# Patient Record
Sex: FEMALE | Employment: OTHER | URBAN - METROPOLITAN AREA
[De-identification: names, ages, dates, MRNs, and addresses within clinical notes are randomized per-mention and may not be internally consistent; named-entity substitution may affect disease eponyms.]

---

## 2019-04-12 RX ORDER — LORAZEPAM 0.5 MG/1
0.5 TABLET ORAL 2 TIMES DAILY
COMMUNITY

## 2019-04-13 ENCOUNTER — ANESTHESIA EVENT (OUTPATIENT)
Dept: OPERATING ROOM | Age: 74
End: 2019-04-13
Payer: MEDICARE

## 2019-04-13 NOTE — H&P
History and Physical    Patient's Name/Date of Birth: Lucille Lyman / 1945 (1 y.o.)    Date: April 13, 2019     Chief Complaint: Decreased vision of the right eye    HPI: Mature right cataract with decreased vision. Risks and complications as well as options and benefits were discussed with the patient and she has elected to proceed with right cataract extraction with intra ocular lens implant. Past Medical History:   Diagnosis Date    Hyperlipidemia     Hypertension        Past Surgical History:   Procedure Laterality Date    ABDOMINAL ADHESION SURGERY      APPENDECTOMY      BACK SURGERY      lumbar laminectomy    CATARACT REMOVAL WITH IMPLANT Left 6 2 15    and removal cystleft upper eyelid    DILATION AND CURETTAGE OF UTERUS      OTHER SURGICAL HISTORY      lymph nodes removed left axilla  d/t 2 bouts of cat scratch fever       Prior to Admission medications    Medication Sig Start Date End Date Taking? Authorizing Provider   LORazepam (ATIVAN) 0.5 MG tablet Take 0.5 mg by mouth 2 times daily. Yes Historical Provider, MD   amLODIPine (NORVASC) 5 MG tablet Take 5 mg by mouth daily    Historical Provider, MD       Iodine and Shellfish-derived products    History reviewed. No pertinent family history.     Social History     Socioeconomic History    Marital status:      Spouse name: Not on file    Number of children: Not on file    Years of education: Not on file    Highest education level: Not on file   Occupational History    Not on file   Social Needs    Financial resource strain: Not on file    Food insecurity:     Worry: Not on file     Inability: Not on file    Transportation needs:     Medical: Not on file     Non-medical: Not on file   Tobacco Use    Smoking status: Current Every Day Smoker     Packs/day: 1.00     Years: 55.00     Pack years: 55.00     Types: Cigarettes    Smokeless tobacco: Never Used   Substance and Sexual Activity    Alcohol use: Yes     Comment: 1-2 beers daily    Drug use: Not on file    Sexual activity: Not on file   Lifestyle    Physical activity:     Days per week: Not on file     Minutes per session: Not on file    Stress: Not on file   Relationships    Social connections:     Talks on phone: Not on file     Gets together: Not on file     Attends Yarsani service: Not on file     Active member of club or organization: Not on file     Attends meetings of clubs or organizations: Not on file     Relationship status: Not on file    Intimate partner violence:     Fear of current or ex partner: Not on file     Emotionally abused: Not on file     Physically abused: Not on file     Forced sexual activity: Not on file   Other Topics Concern    Not on file   Social History Narrative    Not on file       Review of Systems:   CONSTITUTIONAL:  Oriented to person, place and time   EYES:  Mature right cataract with decreased vision effecting reading driving and all routine home activities. Visual acuity is 20/60  HEENT:  negative  RESPIRATORY:  negative  CARDIOVASCULAR:  negative  GASTROINTESTINAL:  negative  GENITOURINARY:  negative  INTEGUMENT/BREAST:  negative  HEMATOLOGIC/LYMPHATIC:  negative  ALLERGIC/IMMUNOLOGIC:  negative  ENDOCRINE:  negative  MUSCULOSKELETAL:  negative  NEUROLOGICAL:  negative  BEHAVIOR/PSYCH:  negative    Physical Exam:  Vitals:    04/12/19 1112   Weight: 120 lb (54.4 kg)   Height: 5' 3\" (1.6 m)       CONSTITUTIONAL:  awake, alert, cooperative, no apparent distress, and appears stated age  EYES:  Lids and lashes normal, pupils equal, round and reactive to light, extra ocular muscles intact, sclera clear, conjunctiva normal  ENT:  Normocephalic, without obvious abnormality, atraumatic, sinuses nontender on palpation, external ears without lesions, oral pharynx with moist mucus membranes, tonsils without erythema or exudates, gums normal and good dentition.   NECK:  Supple, symmetrical, trachea midline, no adenopathy, thyroid symmetric, not enlarged and no tenderness, skin normal  HEMATOLOGIC/LYMPHATICS:  no cervical lymphadenopathy and no supraclavicular lymphadenopathy  BACK:  Symmetric, no curvature, spinous processes are non-tender on palpation, paraspinous muscles are non-tender on palpation, no costal vertebral tenderness  LUNGS:  No increased work of breathing, good air exchange, clear to auscultation bilaterally, no crackles or wheezing  CARDIOVASCULAR:  Normal apical impulse, regular rate and rhythm, normal S1 and S2, no S3 or S4, and no murmur noted  ABDOMEN:  No scars, normal bowel sounds, soft, non-distended, non-tender, no masses palpated, no hepatosplenomegally  CHEST/BREASTS:  Breasts symmetrical, skin without lesion(s), no nipple retraction or dimpling, no nipple discharge, no masses palpated, no axillary or supraclavicular adenopathy  GENITAL/URINARY: Deferred  MUSCULOSKELETAL:  There is no redness, warmth, or swelling of the joints. Full range of motion noted. Motor strength is 5 out of 5 all extremities bilaterally. Tone is normal.  NEUROLOGIC:  Awake, alert, oriented to name, place and time. Cranial nerves II-XII are grossly intact. Motor is 5 out of 5 bilaterally. Cerebellar finger to nose, heel to shin intact. Sensory is intact. Babinski down going, Romberg negative, and gait is normal.  SKIN:  no bruising or bleeding, normal skin color, texture, turgor, no redness, warmth, or swelling and no rashes    Assessment:  Active Problems:    * No active hospital problems. *  Resolved Problems:    * No resolved hospital problems. *      Plan:  1.  Right cataract extraction with intra ocular lens implant    Electronically signed by Abiel Adams MD on 4/13/19 at 7:59 PM

## 2019-04-15 ASSESSMENT — LIFESTYLE VARIABLES: SMOKING_STATUS: 1

## 2019-04-15 NOTE — ANESTHESIA PRE PROCEDURE
Department of Anesthesiology  Preprocedure Note       Name:  Juvenal Martino   Age:  74 y.o.  :  1945                                          MRN:  [id number]         Date:  2019      Surgeon: Andrae Hartley):  Magda Rojas MD    Procedure: RIGHT EYE CATARACT EMULSIFICATION IOL IMPLANT (Right )    Medications prior to admission:   Prior to Admission medications    Medication Sig Start Date End Date Taking? Authorizing Provider   LORazepam (ATIVAN) 0.5 MG tablet Take 0.5 mg by mouth 2 times daily. Yes Historical Provider, MD   amLODIPine (NORVASC) 5 MG tablet Take 5 mg by mouth daily   Yes Historical Provider, MD       Current medications:    Current Facility-Administered Medications   Medication Dose Route Frequency Provider Last Rate Last Dose    lactated ringers infusion   Intravenous Continuous Edmund Palomares MD        flurbiprofen (OCUFEN) 0.03 % ophthalmic solution 1 drop  1 drop Right Eye Q5 Min Lucy BARCENAS MD        phenylephrine (MYDFRIN) 2.5 % ophthalmic solution 1 drop  1 drop Right Eye Q5 Min Lucy BARCENAS MD        tetracaine (TETRAVISC) 0.5 % ophthalmic solution 1 drop  1 drop Right Eye Once Magda Rojas MD        cyclopentolate (CYCLOGYL) 1 % ophthalmic solution 1 drop  1 drop Right Eye Q5 Min Magda Rojas MD        phenylephrine (JULIANNE-SYNEPHRINE) 10 % ophthalmic solution 1 drop  1 drop Right Eye PRN Magda Rojas MD           Allergies:     Allergies   Allergen Reactions    Iodine Anaphylaxis and Hives     IVP and topical    Shellfish-Derived Products Anaphylaxis       Problem List:    Patient Active Problem List   Diagnosis Code    Left cataract H26.9    Inclusion cyst L72.0       Past Medical History:        Diagnosis Date    Hyperlipidemia     Hypertension        Past Surgical History:        Procedure Laterality Date    ABDOMINAL ADHESION SURGERY      APPENDECTOMY      BACK SURGERY      lumbar laminectomy    CATARACT REMOVAL WITH IMPLANT Left 6 2 15    and removal cystleft upper eyelid    DILATION AND CURETTAGE OF UTERUS      OTHER SURGICAL HISTORY      lymph nodes removed left axilla  d/t 2 bouts of cat scratch fever       Social History:    Social History     Tobacco Use    Smoking status: Current Every Day Smoker     Packs/day: 1.00     Years: 55.00     Pack years: 55.00     Types: Cigarettes    Smokeless tobacco: Never Used   Substance Use Topics    Alcohol use: Yes     Comment: 1-2 beers daily                                Ready to quit: Not Answered  Counseling given: Not Answered      Vital Signs (Current):   Vitals:    04/12/19 1112 04/16/19 0742   BP:  124/69   Pulse:  59   Resp:  16   Temp:  98 °F (36.7 °C)   SpO2:  97%   Weight: 120 lb (54.4 kg) 124 lb (56.2 kg)   Height: 5' 3\" (1.6 m) 5' 3\" (1.6 m)                                              BP Readings from Last 3 Encounters:   04/16/19 124/69   06/02/15 131/69       NPO Status: Time of last liquid consumption: 2000                        Time of last solid consumption: 2000                        Date of last liquid consumption: 04/15/19                        Date of last solid food consumption: 04/15/19    BMI:   Wt Readings from Last 3 Encounters:   04/16/19 124 lb (56.2 kg)   06/02/15 136 lb (61.7 kg)   05/29/15 140 lb (63.5 kg)     Body mass index is 21.97 kg/m². CBC: No results found for: WBC, RBC, HGB, HCT, MCV, RDW, PLT    CMP: No results found for: NA, K, CL, CO2, BUN, CREATININE, GFRAA, AGRATIO, LABGLOM, GLUCOSE, PROT, CALCIUM, BILITOT, ALKPHOS, AST, ALT    POC Tests: No results for input(s): POCGLU, POCNA, POCK, POCCL, POCBUN, POCHEMO, POCHCT in the last 72 hours.     Coags: No results found for: PROTIME, INR, APTT    HCG (If Applicable): No results found for: PREGTESTUR, PREGSERUM, HCG, HCGQUANT     ABGs: No results found for: PHART, PO2ART, TKX7QYI, IHS1FNB, BEART, D5PPBUOC     Type & Screen (If Applicable):  No results found for: LABABO, [address]    Anesthesia Evaluation  Patient summary reviewed and Nursing notes reviewed  Airway: Mallampati: III  TM distance: <3 FB   Neck ROM: full  Mouth opening: > = 3 FB Dental: normal exam         Pulmonary: breath sounds clear to auscultation  (+) current smoker (55 pk yrs)                           Cardiovascular:    (+) hypertension:, hyperlipidemia        Rhythm: regular  Rate: normal           Beta Blocker:  Not on Beta Blocker         Neuro/Psych:                ROS comment: Lumbar falcon GI/Hepatic/Renal:            ROS comment: Lysis of adhesions. Endo/Other: Negative Endo/Other ROS                    Abdominal:         (-) obese     Vascular: negative vascular ROS. Anesthesia Plan      MAC     ASA 2       Induction: intravenous. Anesthetic plan and risks discussed with patient. Plan discussed with CRNA. Shandra Walsh MD   4/15/2019  DOS STAFF ADDENDUM:    Pt seen and examined, chart reviewed (including anesthesia, drug and allergy history). Anesthetic plan, risks, benefits, alternatives, and personnel involved discussed with patient. Patient verbalized an understanding and agrees to proceed. Plan discussed with care team members and agreed upon.     Mariano Mckeon MD  Staff Anesthesiologist  8:03 AM

## 2019-04-16 ENCOUNTER — ANESTHESIA (OUTPATIENT)
Dept: OPERATING ROOM | Age: 74
End: 2019-04-16
Payer: MEDICARE

## 2019-04-16 ENCOUNTER — HOSPITAL ENCOUNTER (OUTPATIENT)
Age: 74
Setting detail: OUTPATIENT SURGERY
Discharge: HOME | End: 2019-04-16
Attending: OPHTHALMOLOGY | Admitting: OPHTHALMOLOGY
Payer: MEDICARE

## 2019-04-16 VITALS
RESPIRATION RATE: 16 BRPM | BODY MASS INDEX: 21.97 KG/M2 | OXYGEN SATURATION: 97 % | HEIGHT: 63 IN | DIASTOLIC BLOOD PRESSURE: 55 MMHG | WEIGHT: 124 LBS | SYSTOLIC BLOOD PRESSURE: 128 MMHG | HEART RATE: 60 BPM | TEMPERATURE: 98 F

## 2019-04-16 VITALS
TEMPERATURE: 98.6 F | SYSTOLIC BLOOD PRESSURE: 121 MMHG | RESPIRATION RATE: 11 BRPM | OXYGEN SATURATION: 99 % | DIASTOLIC BLOOD PRESSURE: 67 MMHG

## 2019-04-16 PROBLEM — H26.9 RIGHT CATARACT: Status: ACTIVE | Noted: 2019-04-16

## 2019-04-16 PROCEDURE — 3600000003 HC SURGERY LEVEL 3 BASE: Performed by: OPHTHALMOLOGY

## 2019-04-16 PROCEDURE — 3700000000 HC ANESTHESIA ATTENDED CARE: Performed by: OPHTHALMOLOGY

## 2019-04-16 PROCEDURE — 6370000000 HC RX 637 (ALT 250 FOR IP): Performed by: OPHTHALMOLOGY

## 2019-04-16 PROCEDURE — 2580000003 HC RX 258: Performed by: ANESTHESIOLOGY

## 2019-04-16 PROCEDURE — 7100000011 HC PHASE II RECOVERY - ADDTL 15 MIN: Performed by: OPHTHALMOLOGY

## 2019-04-16 PROCEDURE — 7100000010 HC PHASE II RECOVERY - FIRST 15 MIN: Performed by: OPHTHALMOLOGY

## 2019-04-16 PROCEDURE — 2500000003 HC RX 250 WO HCPCS: Performed by: NURSE ANESTHETIST, CERTIFIED REGISTERED

## 2019-04-16 PROCEDURE — 2709999900 HC NON-CHARGEABLE SUPPLY: Performed by: OPHTHALMOLOGY

## 2019-04-16 PROCEDURE — 6360000002 HC RX W HCPCS: Performed by: NURSE ANESTHETIST, CERTIFIED REGISTERED

## 2019-04-16 PROCEDURE — V2632 POST CHMBR INTRAOCULAR LENS: HCPCS | Performed by: OPHTHALMOLOGY

## 2019-04-16 PROCEDURE — 2500000003 HC RX 250 WO HCPCS: Performed by: OPHTHALMOLOGY

## 2019-04-16 DEVICE — LENS INTOCU +19.5 DIOPT A CONSTANT 118.8 L13MM DIA6MM 0DEG: Type: IMPLANTABLE DEVICE | Site: EYE | Status: FUNCTIONAL

## 2019-04-16 RX ORDER — SODIUM CHLORIDE, SODIUM LACTATE, POTASSIUM CHLORIDE, CALCIUM CHLORIDE 600; 310; 30; 20 MG/100ML; MG/100ML; MG/100ML; MG/100ML
INJECTION, SOLUTION INTRAVENOUS CONTINUOUS
Status: DISCONTINUED | OUTPATIENT
Start: 2019-04-16 | End: 2019-04-16 | Stop reason: HOSPADM

## 2019-04-16 RX ORDER — TETRACAINE HYDROCHLORIDE 5 MG/ML
SOLUTION OPHTHALMIC PRN
Status: DISCONTINUED | OUTPATIENT
Start: 2019-04-16 | End: 2019-04-16 | Stop reason: ALTCHOICE

## 2019-04-16 RX ORDER — CYCLOPENTOLATE HYDROCHLORIDE 10 MG/ML
1 SOLUTION/ DROPS OPHTHALMIC
Status: COMPLETED | OUTPATIENT
Start: 2019-04-16 | End: 2019-04-16

## 2019-04-16 RX ORDER — TETRACAINE HYDROCHLORIDE 5 MG/ML
1 SOLUTION OPHTHALMIC ONCE
Status: COMPLETED | OUTPATIENT
Start: 2019-04-16 | End: 2019-04-16

## 2019-04-16 RX ORDER — BALANCED SALT SOLUTION 6.4; .75; .48; .3; 3.9; 1.7 MG/ML; MG/ML; MG/ML; MG/ML; MG/ML; MG/ML
SOLUTION OPHTHALMIC PRN
Status: DISCONTINUED | OUTPATIENT
Start: 2019-04-16 | End: 2019-04-16 | Stop reason: ALTCHOICE

## 2019-04-16 RX ORDER — ALFENTANIL HYDROCHLORIDE 500 UG/ML
INJECTION INTRAVENOUS PRN
Status: DISCONTINUED | OUTPATIENT
Start: 2019-04-16 | End: 2019-04-16 | Stop reason: SDUPTHER

## 2019-04-16 RX ORDER — PHENYLEPHRINE HCL 2.5 %
1 DROPS OPHTHALMIC (EYE)
Status: COMPLETED | OUTPATIENT
Start: 2019-04-16 | End: 2019-04-16

## 2019-04-16 RX ORDER — FLURBIPROFEN SODIUM 0.3 MG/ML
1 SOLUTION/ DROPS OPHTHALMIC
Status: COMPLETED | OUTPATIENT
Start: 2019-04-16 | End: 2019-04-16

## 2019-04-16 RX ORDER — PHENYLEPHRINE HYDROCHLORIDE 100 MG/ML
1 SOLUTION/ DROPS OPHTHALMIC PRN
Status: DISCONTINUED | OUTPATIENT
Start: 2019-04-16 | End: 2019-04-16 | Stop reason: HOSPADM

## 2019-04-16 RX ORDER — MIDAZOLAM HYDROCHLORIDE 1 MG/ML
INJECTION INTRAMUSCULAR; INTRAVENOUS PRN
Status: DISCONTINUED | OUTPATIENT
Start: 2019-04-16 | End: 2019-04-16 | Stop reason: SDUPTHER

## 2019-04-16 RX ADMIN — CYCLOPENTOLATE HYDROCHLORIDE 1 DROP: 10 SOLUTION/ DROPS OPHTHALMIC at 07:45

## 2019-04-16 RX ADMIN — SODIUM CHLORIDE, POTASSIUM CHLORIDE, SODIUM LACTATE AND CALCIUM CHLORIDE: 600; 310; 30; 20 INJECTION, SOLUTION INTRAVENOUS at 08:05

## 2019-04-16 RX ADMIN — PHENYLEPHRINE HYDROCHLORIDE 1 DROP: 25 SOLUTION/ DROPS OPHTHALMIC at 07:55

## 2019-04-16 RX ADMIN — ALFENTANIL HYDROCHLORIDE 250 MCG: 500 INJECTION INTRAVENOUS at 09:01

## 2019-04-16 RX ADMIN — PHENYLEPHRINE HYDROCHLORIDE 1 DROP: 25 SOLUTION/ DROPS OPHTHALMIC at 07:45

## 2019-04-16 RX ADMIN — CYCLOPENTOLATE HYDROCHLORIDE 1 DROP: 10 SOLUTION/ DROPS OPHTHALMIC at 07:50

## 2019-04-16 RX ADMIN — FLURBIPROFEN SODIUM 1 DROP: 0.3 SOLUTION/ DROPS OPHTHALMIC at 07:55

## 2019-04-16 RX ADMIN — FLURBIPROFEN SODIUM 1 DROP: 0.3 SOLUTION/ DROPS OPHTHALMIC at 07:45

## 2019-04-16 RX ADMIN — ALFENTANIL HYDROCHLORIDE 250 MCG: 500 INJECTION INTRAVENOUS at 09:04

## 2019-04-16 RX ADMIN — MIDAZOLAM 2 MG: 1 INJECTION INTRAMUSCULAR; INTRAVENOUS at 08:59

## 2019-04-16 RX ADMIN — FLURBIPROFEN SODIUM 1 DROP: 0.3 SOLUTION/ DROPS OPHTHALMIC at 07:50

## 2019-04-16 RX ADMIN — PHENYLEPHRINE HYDROCHLORIDE 1 DROP: 25 SOLUTION/ DROPS OPHTHALMIC at 07:50

## 2019-04-16 RX ADMIN — CYCLOPENTOLATE HYDROCHLORIDE 1 DROP: 10 SOLUTION/ DROPS OPHTHALMIC at 07:55

## 2019-04-16 RX ADMIN — ALFENTANIL HYDROCHLORIDE 250 MCG: 500 INJECTION INTRAVENOUS at 09:00

## 2019-04-16 RX ADMIN — TETRACAINE HYDROCHLORIDE 1 DROP: 25 LIQUID OPHTHALMIC at 08:08

## 2019-04-16 ASSESSMENT — PULMONARY FUNCTION TESTS
PIF_VALUE: 0

## 2019-04-16 ASSESSMENT — PAIN SCALES - GENERAL
PAINLEVEL_OUTOF10: 0

## 2019-04-16 ASSESSMENT — PAIN - FUNCTIONAL ASSESSMENT: PAIN_FUNCTIONAL_ASSESSMENT: 0-10

## 2019-04-16 NOTE — ANESTHESIA POSTPROCEDURE EVALUATION
Department of Anesthesiology  Postprocedure Note    Patient: Kay Nunez  MRN: [id number]  YOB: 1945  Date of evaluation: 4/16/2019  Time:  9:27 AM     Procedure Summary     Date:  04/16/19 Room / Location:  [address] [place] [address] 02 / [address] [place] [address]    Anesthesia Start:  0858 Anesthesia Stop:  0910    Procedure:  RIGHT EYE CATARACT EMULSIFICATION IOL IMPLANT (Right Eye) Diagnosis:  (RIGHT EYE CATARACT)    Surgeon:  Elver Tafoya MD Responsible Provider:  Abdi Culver MD    Anesthesia Type:  MAC ASA Status:  2          Anesthesia Type: MAC    Osmar Phase I: Osmar Score: 10    Osmar Phase II: Osmar Score: 10    Last vitals: Reviewed and per EMR flowsheets.        Anesthesia Post Evaluation    Patient location during evaluation: PACU  Patient participation: complete - patient participated  Level of consciousness: awake and alert  Airway patency: patent  Nausea & Vomiting: no nausea and no vomiting  Complications: no  Cardiovascular status: hemodynamically stable  Respiratory status: acceptable  Hydration status: euvolemic

## 2019-05-01 NOTE — OP NOTE
PREOPERATIVE DIAGNOSIS:  Right Cataract    POSTOPERATIVE DIAGNOSIS:  Right Cataract    PROCEDURE:  Right phacoemulsification with intraocular lens implant. ANESTHESIA:  Local Mac    ESTIMATED BLOOD LOSS:  Minimal    COMPLICATIONS:  None    DESCRIPTION OF PROCEDURE:  The patient was brought into the operating room. The operative eye was marked, which was the right eye. The right eye was then prepped with a full-strength Betadine preparation. The periorbital area was copiously washed with Betadine. The lashes were washed with Betadine. Dilute Betadine was then also put in the inferior and superior fornices and left in place for approximately a minute or 2. This was then irrigated with sterile water. The face was then wiped and the preparation was repeated 1 more time. The drape was then placed over the operative eye with the sticky adhesive placed at the lid margins so that it draped the lashes out of the operative field superiorly and inferiorly, as well as isolated the meibomian glands behind the drape. This cleared the operative field of any meibomian gland secretions and eyelashes. Next, a lid speculum was positioned in the right eye. A super sharp blade was used to make a side-port incision at the 2 o'clock position. A clear corneal incision was fashioned over the 12 o;clock meridian with a 2.3mm keratome at the limbus. Healon was used to reform the anterior chamber. A continuous tear anterior capsulotomy was then performed with a bent needle cystotome. Hydrodissection was performed by irrigating with balanced salt solution through a syringe underneath the anterior capsular flap to loosen and allow free rotation of the lens nucleus. Phacoemulsification was used and the entire lens nucleus was removed. The I A unit was instilled in the eye, and the remaining cortex was removed. Healon was used to separate the anterior and posterior capsular flaps.   The PCBOO 19.5 diopter lens was then instilled into the capsular bag and dialed to 3 and 9 o'clock positions. Healon was removed from in front of and behind the lens. The lens was found to be well centered, well positioned in the bag and stable. The wound was checked and found to be airtight and watertight. The lid speculum was removed and the drape was removed. The patient was brought to the recovery room in excellent condition, given postoperative instructions, and discharge in excellent condition.

## 2024-06-21 ENCOUNTER — OFFICE VISIT (OUTPATIENT)
Dept: NEUROLOGY | Facility: HOSPITAL | Age: 79
End: 2024-06-21
Payer: MEDICARE

## 2024-06-21 VITALS
TEMPERATURE: 97.1 F | RESPIRATION RATE: 18 BRPM | DIASTOLIC BLOOD PRESSURE: 80 MMHG | HEART RATE: 85 BPM | WEIGHT: 123 LBS | SYSTOLIC BLOOD PRESSURE: 138 MMHG | HEIGHT: 64 IN | BODY MASS INDEX: 21 KG/M2

## 2024-06-21 DIAGNOSIS — R26.89 IMBALANCE: Primary | ICD-10-CM

## 2024-06-21 DIAGNOSIS — R42 DIZZINESS: ICD-10-CM

## 2024-06-21 PROCEDURE — 99205 OFFICE O/P NEW HI 60 MIN: CPT | Performed by: PSYCHIATRY & NEUROLOGY

## 2024-06-21 PROCEDURE — 99215 OFFICE O/P EST HI 40 MIN: CPT | Performed by: PSYCHIATRY & NEUROLOGY

## 2024-06-21 RX ORDER — LORAZEPAM 0.5 MG/1
0.5 TABLET ORAL
COMMUNITY

## 2024-06-21 RX ORDER — CITALOPRAM 10 MG/1
10 TABLET ORAL
COMMUNITY
Start: 2024-05-10

## 2024-06-21 ASSESSMENT — ENCOUNTER SYMPTOMS
OCCASIONAL FEELINGS OF UNSTEADINESS: 1
DEPRESSION: 0
LOSS OF SENSATION IN FEET: 0

## 2024-06-21 NOTE — PROGRESS NOTES
"CC: dizziness, imbalance    HPI:   Here with her brother, who found my name on the internet and made this appointment for a second opinion.   Kristin has a hx of dizziness for 2-3 years, she is fine when sitting but if she gets up she loses her balance and gets dizzy, no falls, no spinning sensation, getting worse over time. Saw ENT in [place], no obvious pathology, had PT w/o benefits, saw Dr Power (\"said it was related to aging\"). Had CTH reportedly normal, has not had MRI.   Even sitting now in the office everything seems blurred, fuzzy, but she is seeing retina for macular degeneration. For the last 2-3 years the left hand's fingers feel numb, sometimes can involve the arm, and it is starting to involve the right hand.     Current symptoms:  As above  Independent, lives by self but more trouble b/o her poor vision    The patient denies cognitive symptoms except those expected with age, diplopia, bulbar symptoms, weakness,  Lhermitte's phenomenon .     ROS  10-point review of systems was negative except as mentioned in the HPI and/or the [facility] form.     PMH  Depression, anxiety  Urinary incontinence for 2-3 years    FMH  Mother passed away with Alzheimer and also her brother    SH  Smokes 10 cigarettes a day  A beer once in a while    ALL  iodine    MEDS    Current Outpatient Medications:     citalopram (CeleXA) 10 mg tablet, Take 1 tablet (10 mg) by mouth early in the morning.., Disp: , Rfl:     LORazepam (Ativan) 0.5 mg tablet, Take 1 tablet (0.5 mg) by mouth., Disp: , Rfl:      PHYSICAL EXAM  VITALS  /80   Pulse 85   Temp 36.2 °C (97.1 °F)   Resp 18   Ht 1.626 m (5' 4\")   Wt 55.8 kg (123 lb)   BMI 21.11 kg/m²      Normal general appearance.   The patient was alert and oriented to person, place, and time with normal language, attention and concentration, recent and remote memory. Normal fund of knowledge.   Visual fields were full to confrontation.  Pupils were 3 mm and reactive OU.  Funduscopic " examination unable due to miosis.  Eye movements: normal fixation, no spontaneous or gaze-evoked nystagmus, normal speed and amplitude of horizontal and vertical saccades, no saccadic dysmetria, choppy smooth pursuit, normal VOR.  Facial sensation to light touch and pinprick was normal.  Muscles of mastication and facial expression moved normally.  Hearing was normal to conversation and finger rub.  Sternocleidomastoid and trapezius power were normal.  Tongue movements were normal.  There was no dysarthria.    There was no pronator drift, no orbiting. Normal bulk and tone.    Muscle Strenght (#/5):  Upper extremity    Deltoids Right 5 Left 5  Biceps Right 5 Left 5  Triceps Right 5 Left 5   Right 5 Left 5  MADHURI Right 5 Left 5  Lower extremity    Iliopsoas Right 5 Left 5  Quadriceps Right 5 Left 5  Hamstrings Right 5 Left 5  Tibialis ant Right 5 Left 5  Gastrocn Right 5 Left 5    Reflexes:  Upper extremity    Reflexes 1+ upper and lower extremities   Achilles Right tr Left tr  Plant Cut Right Down Left Down    Coordination in the arms and legs was intact including point-to-point, rapid-alternating, and fine movements. Had mild UE intention tremor.  Light touch, pinprick, and proprioception were overall mer and symmetrical.   Standard gait was precautionary, mildly wide based, possible w/o assistance     Assessment/Plan    79-year-old woman, pretty healthy, with 2 to 3 years of non-spinning dizziness exacerbated by upright posture, disabling, with negative ENT workup and normal neurological exam, no evidence of cerebellar, extrapyramidal dysfunction or neuropathy.  She could have PPPD and her dysfunction might be aggravated by her vision loss.  Mild head trauma before onset of symptoms. Reasonable to get a brain MRI to rule out intracranial processes since she never had one.  I will also refer her to vestibular therapy for specific treatment of possible PPPD.    Diagnoses and all orders for this visit:  Imbalance  (Primary)  -     MR brain wo IV contrast; Future  -     Referral to Physical Therapy; Future  Dizziness  -     MR brain wo IV contrast; Future  -     Referral to Physical Therapy; Future

## 2024-07-10 ENCOUNTER — CLINICAL SUPPORT (OUTPATIENT)
Dept: PHYSICAL THERAPY | Facility: HOSPITAL | Age: 79
End: 2024-07-10
Payer: MEDICARE

## 2024-07-10 DIAGNOSIS — R26.89 IMBALANCE: Primary | ICD-10-CM

## 2024-07-10 DIAGNOSIS — R42 DIZZINESS: ICD-10-CM

## 2024-07-10 PROCEDURE — 97161 PT EVAL LOW COMPLEX 20 MIN: CPT | Mod: GP | Performed by: PHYSICAL THERAPIST

## 2024-07-10 PROCEDURE — 97110 THERAPEUTIC EXERCISES: CPT | Mod: GP | Performed by: PHYSICAL THERAPIST

## 2024-07-10 ASSESSMENT — PATIENT HEALTH QUESTIONNAIRE - PHQ9
SUM OF ALL RESPONSES TO PHQ QUESTIONS 1-9: 8
1. LITTLE INTEREST OR PLEASURE IN DOING THINGS: NEARLY EVERY DAY
2. FEELING DOWN, DEPRESSED OR HOPELESS: MORE THAN HALF THE DAYS
SUM OF ALL RESPONSES TO PHQ9 QUESTIONS 1 AND 2: 5
6. FEELING BAD ABOUT YOURSELF - OR THAT YOU ARE A FAILURE OR HAVE LET YOURSELF OR YOUR FAMILY DOWN: NOT AT ALL
5. POOR APPETITE OR OVEREATING: NOT AT ALL
3. TROUBLE FALLING OR STAYING ASLEEP OR SLEEPING TOO MUCH: SEVERAL DAYS
10. IF YOU CHECKED OFF ANY PROBLEMS, HOW DIFFICULT HAVE THESE PROBLEMS MADE IT FOR YOU TO DO YOUR WORK, TAKE CARE OF THINGS AT HOME, OR GET ALONG WITH OTHER PEOPLE: SOMEWHAT DIFFICULT
4. FEELING TIRED OR HAVING LITTLE ENERGY: MORE THAN HALF THE DAYS
7. TROUBLE CONCENTRATING ON THINGS, SUCH AS READING THE NEWSPAPER OR WATCHING TELEVISION: NOT AT ALL
9. THOUGHTS THAT YOU WOULD BE BETTER OFF DEAD, OR OF HURTING YOURSELF: NOT AT ALL
8. MOVING OR SPEAKING SO SLOWLY THAT OTHER PEOPLE COULD HAVE NOTICED. OR THE OPPOSITE, BEING SO FIGETY OR RESTLESS THAT YOU HAVE BEEN MOVING AROUND A LOT MORE THAN USUAL: NOT AT ALL

## 2024-07-10 ASSESSMENT — PAIN - FUNCTIONAL ASSESSMENT: PAIN_FUNCTIONAL_ASSESSMENT: 0-10

## 2024-07-10 ASSESSMENT — ENCOUNTER SYMPTOMS
OCCASIONAL FEELINGS OF UNSTEADINESS: 1
DEPRESSION: 1
LOSS OF SENSATION IN FEET: 0

## 2024-07-10 ASSESSMENT — PAIN SCALES - GENERAL: PAINLEVEL_OUTOF10: 0 - NO PAIN

## 2024-07-10 NOTE — PROGRESS NOTES
Physical Therapy    Physical Therapy Evaluation and Treatment      Patient Name: Kristin Alaniz  MRN: [id number]  : 1945   Today's Date: 7/10/2024  Time Calculation  Start Time: 1030  Stop Time: 1130  Time Calculation (min): 60 min     PT Evaluation Time Entry  PT Evaluation (Low) Time Entry: 45  PT Therapeutic Procedures Time Entry  Therapeutic Exercise Time Entry: 15    Visit # 1    Assessment:  PT Assessment Results: Decreased mobility, Impaired balance (Dizziness)  Rehab Prognosis: Good    Plan:  Treatment/Interventions: Education/ Instruction, Neuromuscular re-education, Therapeutic exercises, Gait training  PT Plan: Skilled PT  PT Frequency: 1 time per week  Duration: 4-8 wks  Rehab Potential: Good  Plan of Care Agreement: Patient    Current Problem:   1. Imbalance  Referral to Physical Therapy    Follow Up In Physical Therapy      2. Dizziness  Referral to Physical Therapy    Follow Up In Physical Therapy          Subjective      Chief complaint: Pt states she has been dizzy/imbalanced for at least three years. , getting progressively worse. States she has been to ENT and two neurologists and she and her brother who is present state nothing can be found. Has been to PT twice without much benefit.  No falls. Walks around the house OK mainly because furniture within arm's reach. Has a walker used for getting the mail. For appts she hangs on to her brother. Is being treated for deteriorating retinas, injections every six months, vision not too bad currently. Denies neck, head or ear pain, denies tinnitus. Denies room spinning or problems with bed mobility. Was hit on the head by a heavy clock a few months before dizziness started. Very seldomly feels dizzy in sitting.     Pain Better: NA    Pain Worse: NA    Imaging: MRI scheduled    Prior level of function: (-) driving, niece helps with home cleaning, fixes easy meals for herself    Current limitations: standing, walking    Home setup: lives alone in [facility],  shower bar    Work: Retired     Patient's goal: Relieve dizziness    Precautions:  Precautions  STEADI Fall Risk Score (The score of 4 or more indicates an increased risk of falling): 10  Precautions Comment: Fall risk    Pain:  Pain Assessment  Pain Assessment: 0-10  0-10 (Numeric) Pain Score: 0 - No pain    Objective:  Objective   Cervical AROM: WFL    Smooth pursuits: some difficulty in right lower quadrant  Saccades: WNL  Convergence: WNL    Head shake (horizontal): (-)   Head shake (vertical): (-)    Head thrust test: (+) katie though minor and intermittent    Resting nystagmus (blocked fixation): (-)  Gaze-evoked nystagmus (blocked fixation): (-) katie    Feet apart, eyes open: WNL   Feet apart, eyes closed: SWAY  Feet together, eyes open: SWAY  Feet together, eyes closed: LOB  Compliant surface, eyes open: SWAY  Compliant surface, eyes closed: LOB      Outcome Measures:  Other Measures  Dizziness Handicap Inventory: 68     Treatments:  EXERCISES Date  Date  Date Date   VISIT# # # # #    REPS REPS REPS REPS          Nu-Step              Parallel bars:       Lateral walking       Marches       Backwards walking       Walk with head turns, horiz/vert       X1 walk, horiz/vert       VOR cancel walk       Tandem stance                                   Foam:       Head turns, horiz/vert       Reaching       Marching                                   Wall falls                            Seated:       2-target VOR       X2 horiz/vert                                          HEP: Access Code: [email or web address]  URL: [email or web address]  Date: 07/10/2024  Prepared by: Magno Bennett    Exercises  - Seated Gaze Stabilization with Head Rotation  - 2 x daily - 2 sets  - Seated Gaze Stabilization with Head Nod  - 2 x daily - 2 sets  - Side to Side Weight Shift with Unilateral Counter Support  - 2 x daily - 2 sets - 20 reps  - Staggered Stance Forward Backward Weight Shift with Counter Support  - 2 x daily - 2  sets - 20 reps          Goals:  Active       Dizziness/imbalance       Pt will walk safely with <=cane for >=200 feet for improved community negotiation.        Start:  07/10/24    Expected End:  10/08/24            Improve DHI score >=40 points to decrease risk of falling.         Start:  07/10/24    Expected End:  10/08/24            Pt will hold feet together, eyes closed for 30 seconds without LOB for walking in the dark.        Start:  07/10/24    Expected End:  10/08/24            Independent HEP for continued gains after PT is complete.        Start:  07/10/24    Expected End:  10/08/24

## 2024-07-13 ENCOUNTER — HOSPITAL ENCOUNTER (OUTPATIENT)
Dept: RADIOLOGY | Facility: HOSPITAL | Age: 79
Discharge: HOME | End: 2024-07-13
Payer: MEDICARE

## 2024-07-13 DIAGNOSIS — R42 DIZZINESS: ICD-10-CM

## 2024-07-13 DIAGNOSIS — R26.89 IMBALANCE: ICD-10-CM

## 2024-07-13 PROCEDURE — 70551 MRI BRAIN STEM W/O DYE: CPT

## 2024-07-13 PROCEDURE — 70551 MRI BRAIN STEM W/O DYE: CPT | Performed by: RADIOLOGY

## 2024-07-17 DIAGNOSIS — R26.89 IMBALANCE: Primary | ICD-10-CM

## 2024-07-17 DIAGNOSIS — R42 DIZZINESS: ICD-10-CM

## 2024-07-17 NOTE — PROGRESS NOTES
Spoke with patient and informed her that Dr. Lyman read her MRI of the brain and saw small vessel disease and possible small stroke. He wants her to start on a baby aspirin and get a carotid US. Orders for US placed in patient's chart and phone number given to patient to call radiology.

## 2024-07-22 ENCOUNTER — TREATMENT (OUTPATIENT)
Dept: PHYSICAL THERAPY | Facility: HOSPITAL | Age: 79
End: 2024-07-22
Payer: MEDICARE

## 2024-07-22 DIAGNOSIS — R26.89 IMBALANCE: Primary | ICD-10-CM

## 2024-07-22 DIAGNOSIS — R42 DIZZINESS: ICD-10-CM

## 2024-07-22 PROCEDURE — 97110 THERAPEUTIC EXERCISES: CPT | Mod: GP | Performed by: PHYSICAL THERAPIST

## 2024-07-22 ASSESSMENT — PAIN SCALES - GENERAL: PAINLEVEL_OUTOF10: 0 - NO PAIN

## 2024-07-22 ASSESSMENT — PAIN - FUNCTIONAL ASSESSMENT: PAIN_FUNCTIONAL_ASSESSMENT: 0-10

## 2024-07-22 NOTE — PROGRESS NOTES
"Physical Therapy    Physical Therapy Treatment    Patient Name: Kristin Alaniz  MRN: [id number]  : 1945   Today's Date: 2024  Time Calculation  Start Time: 1630  Stop Time: 1710  Time Calculation (min): 40 min       PT Therapeutic Procedures Time Entry  Therapeutic Exercise Time Entry: 40      Visit #2    Assessment:   Pt recalls HEP with only slight verbal cueing required. Verbal cues to stand upright as she tends to stand flexed at knees and hips. Tries standing hip flexor stretching but she didn't appear too tight and she can straighten up with cueing. I encouraged her to bring her cane to the next visit and use in the community.     Plan:   Add dynamic foam balance exercises.    Current Problem  1. Imbalance  Follow Up In Physical Therapy      2. Dizziness  Follow Up In Physical Therapy          Subjective   General  Pt states she has been doing HEP but unsure if she's doing them right.    Precautions  Precautions  STEADI Fall Risk Score (The score of 4 or more indicates an increased risk of falling): 10    Pain  Pain Assessment: 0-10  0-10 (Numeric) Pain Score: 0 - No pain    Objective   Pt arrives walking without support but near her brother    Treatments:  EXERCISES Date 2024  Date  Date Date   VISIT# #2 # # #    REPS REPS REPS REPS          Nu-Step L2 10'             Parallel bars:       Lateral walking 2 laps      Marches 2 laps      Backwards walking 2 laps      Walk with head turns, horiz/vert 2 laps      X1 walk, horiz/vert       VOR cancel walk       Tandem stance 30\"x 2 ea                                  Foam:       Head turns, horiz/vert       Reaching       Marching                                   Wall falls 10X                           Seated:       2-target VOR       X2 horiz/vert                            Cane gait Reviewed             HEP: Access Code: [email or web address]  URL: [email or web address]  Date: 07/10/2024  Prepared by: Magno Bennett    Exercises  - Seated " Gaze Stabilization with Head Rotation  - 2 x daily - 2 sets  - Seated Gaze Stabilization with Head Nod  - 2 x daily - 2 sets  - Side to Side Weight Shift with Unilateral Counter Support  - 2 x daily - 2 sets - 20 reps  - Staggered Stance Forward Backward Weight Shift with Counter Support  - 2 x daily - 2 sets - 20 reps Reviewed -         Goals:  Active       Dizziness/imbalance       Pt will walk safely with <=cane for >=200 feet for improved community negotiation.        Start:  07/10/24    Expected End:  10/08/24            Improve DHI score >=40 points to decrease risk of falling.         Start:  07/10/24    Expected End:  10/08/24            Pt will hold feet together, eyes closed for 30 seconds without LOB for walking in the dark.        Start:  07/10/24    Expected End:  10/08/24            Independent HEP for continued gains after PT is complete.        Start:  07/10/24    Expected End:  10/08/24

## 2024-07-29 ENCOUNTER — TREATMENT (OUTPATIENT)
Dept: PHYSICAL THERAPY | Facility: HOSPITAL | Age: 79
End: 2024-07-29
Payer: MEDICARE

## 2024-07-29 DIAGNOSIS — R26.89 IMBALANCE: Primary | ICD-10-CM

## 2024-07-29 DIAGNOSIS — R42 DIZZINESS: ICD-10-CM

## 2024-07-29 PROCEDURE — 97110 THERAPEUTIC EXERCISES: CPT | Mod: GP | Performed by: PHYSICAL THERAPIST

## 2024-07-29 ASSESSMENT — PAIN - FUNCTIONAL ASSESSMENT: PAIN_FUNCTIONAL_ASSESSMENT: 0-10

## 2024-07-29 ASSESSMENT — PAIN SCALES - GENERAL: PAINLEVEL_OUTOF10: 0 - NO PAIN

## 2024-07-29 NOTE — PROGRESS NOTES
"Physical Therapy    Physical Therapy Treatment    Patient Name: Kristin Alaniz  MRN: [id number]  : 1945   Today's Date: 2024  Time Calculation  Start Time: 1115  Stop Time: 1155  Time Calculation (min): 40 min       PT Therapeutic Procedures Time Entry  Therapeutic Exercise Time Entry: 40          Visit #3    Assessment:   Pt able to come to therapy today with her cane and not needing to hold on to her brother. Fairly frequent verbal cueing for form and to maintain the correct motions with vestibular exercises. Tactile cues for wall falls. Encouraged her to be compliant with HEP daily to get max benefit from PT. Dizziness about the same as when she came in.     Plan:   Advance as tolerated, continue to encourage HEP compliance for PT benefit.    Current Problem  1. Imbalance  Follow Up In Physical Therapy      2. Dizziness  Follow Up In Physical Therapy          Subjective   General  Pt states her vision has been a bit worse lately due to her macular generation, making her feel a little more dizzy. Also she c/o feeling more stressed with people staying with her which tends to affect her. Has not done her HEP much over the last week.    Precautions  Precautions  STEADI Fall Risk Score (The score of 4 or more indicates an increased risk of falling): 10    Pain  Pain Assessment: 0-10  0-10 (Numeric) Pain Score: 0 - No pain    Objective   Pt arrives using a straight cane today    Treatments:  EXERCISES Date 2024  Date 2024  Date Date   VISIT# #2 #3 # #    REPS REPS REPS REPS          Nu-Step L2 10' L2 10'            Parallel bars:       Lateral walking 2 laps 3 laps     Marches 2 laps 3 laps     Backwards walking 2 laps 3 laps     Walk with head turns, horiz/vert 2 laps 3 laps ea     X1 walk, horiz/vert  2 laps ea     VOR cancel walk, horiz/vert  2 laps ea     Tandem stance 30\"x 2 ea                                  Foam:       Head turns, horiz/vert       Reaching       Marching                       "             Wall falls 10X 10x                          Seated:       2-target VOR       X2 horiz/vert                            Cane gait Reviewed             HEP: Access Code: [email or web address]  URL: [email or web address]  Date: 07/10/2024  Prepared by: Magno Bennett    Exercises  - Seated Gaze Stabilization with Head Rotation  - 2 x daily - 2 sets  - Seated Gaze Stabilization with Head Nod  - 2 x daily - 2 sets  - Side to Side Weight Shift with Unilateral Counter Support  - 2 x daily - 2 sets - 20 reps  - Staggered Stance Forward Backward Weight Shift with Counter Support  - 2 x daily - 2 sets - 20 reps Reviewed -         Goals:  Active       Dizziness/imbalance       Pt will walk safely with <=cane for >=200 feet for improved community negotiation.        Start:  07/10/24    Expected End:  10/08/24            Improve DHI score >=40 points to decrease risk of falling.         Start:  07/10/24    Expected End:  10/08/24            Pt will hold feet together, eyes closed for 30 seconds without LOB for walking in the dark.        Start:  07/10/24    Expected End:  10/08/24            Independent HEP for continued gains after PT is complete.        Start:  07/10/24    Expected End:  10/08/24

## 2024-08-05 ENCOUNTER — TREATMENT (OUTPATIENT)
Dept: PHYSICAL THERAPY | Facility: HOSPITAL | Age: 79
End: 2024-08-05
Payer: MEDICARE

## 2024-08-05 DIAGNOSIS — R26.89 IMBALANCE: Primary | ICD-10-CM

## 2024-08-05 DIAGNOSIS — R42 DIZZINESS: ICD-10-CM

## 2024-08-05 PROCEDURE — 97110 THERAPEUTIC EXERCISES: CPT | Mod: GP | Performed by: PHYSICAL THERAPIST

## 2024-08-05 ASSESSMENT — PAIN - FUNCTIONAL ASSESSMENT: PAIN_FUNCTIONAL_ASSESSMENT: 0-10

## 2024-08-05 ASSESSMENT — PAIN SCALES - GENERAL: PAINLEVEL_OUTOF10: 0 - NO PAIN

## 2024-08-05 NOTE — PROGRESS NOTES
"Physical Therapy    Physical Therapy Treatment    Patient Name: Kristin Alaniz  MRN: [id number]  : 1945   Today's Date: 2024  Time Calculation  Start Time: 1115  Stop Time: 1200  Time Calculation (min): 45 min       PT Therapeutic Procedures Time Entry  Therapeutic Exercise Time Entry: 45      Visit #4    Assessment:   Pt fatigued today with c/o not sleeping well, occasional rest breaks required.     Plan:   Reassess next visit for likely discharge to HEP.    Current Problem  1. Imbalance  Follow Up In Physical Therapy      2. Dizziness  Follow Up In Physical Therapy          Subjective   General  Pt states she's been under a lot of stress which makes her dizziness worse. No falls reported.     Precautions  Precautions  STEADI Fall Risk Score (The score of 4 or more indicates an increased risk of falling): 10    Pain  Pain Assessment: 0-10  0-10 (Numeric) Pain Score: 0 - No pain    Objective     Treatments:  EXERCISES Date 2024  Date 2024  Date 2024  Date   VISIT# #2 #3 #4 #    REPS REPS REPS REPS          Nu-Step L2 10' L2 10' L2 10'           Parallel bars:       Lateral walking 2 laps 3 laps 3 laps    Marches 2 laps 3 laps 3 laps    Backwards walking 2 laps 3 laps 3 laps    Walk with head turns, horiz/vert 2 laps 3 laps ea 3 laps ea    X1 walk, horiz/vert  2 laps ea 2 laps ea    VOR cancel walk, horiz/vert  2 laps ea     Tandem stance 30\"x 2 ea      Walk with lateral step over                            Foam:       Head turns, horiz/vert       Reaching       Marching                                   Wall falls 10X 10x                          Seated:       2-target VOR       X2 horiz/vert                            Cane gait Reviewed             HEP: Access Code: [email or web address]  URL: [email or web address]  Date: 07/10/2024  Prepared by: Magno Bennett    Exercises  - Seated Gaze Stabilization with Head Rotation  - 2 x daily - 2 sets  - Seated Gaze Stabilization with Head Nod  " - 2 x daily - 2 sets  - Side to Side Weight Shift with Unilateral Counter Support  - 2 x daily - 2 sets - 20 reps  - Staggered Stance Forward Backward Weight Shift with Counter Support  - 2 x daily - 2 sets - 20 reps Reviewed -         Goals:  Active       Dizziness/imbalance       Pt will walk safely with <=cane for >=200 feet for improved community negotiation.        Start:  07/10/24    Expected End:  10/08/24            Improve DHI score >=40 points to decrease risk of falling.         Start:  07/10/24    Expected End:  10/08/24            Pt will hold feet together, eyes closed for 30 seconds without LOB for walking in the dark.        Start:  07/10/24    Expected End:  10/08/24            Independent HEP for continued gains after PT is complete.        Start:  07/10/24    Expected End:  10/08/24

## 2024-08-14 ENCOUNTER — HOSPITAL ENCOUNTER (OUTPATIENT)
Dept: VASCULAR MEDICINE | Facility: HOSPITAL | Age: 79
Discharge: HOME | End: 2024-08-14
Payer: MEDICARE

## 2024-08-14 DIAGNOSIS — R42 DIZZINESS: ICD-10-CM

## 2024-08-14 DIAGNOSIS — R26.89 IMBALANCE: ICD-10-CM

## 2024-08-14 DIAGNOSIS — R09.89 OTHER SPECIFIED SYMPTOMS AND SIGNS INVOLVING THE CIRCULATORY AND RESPIRATORY SYSTEMS: ICD-10-CM

## 2024-08-14 PROCEDURE — 93880 EXTRACRANIAL BILAT STUDY: CPT | Performed by: SURGERY

## 2024-08-14 PROCEDURE — 93880 EXTRACRANIAL BILAT STUDY: CPT

## 2024-08-16 DIAGNOSIS — I63.9 CEREBROVASCULAR ACCIDENT (CVA), UNSPECIFIED MECHANISM (MULTI): Primary | ICD-10-CM

## 2024-08-19 ENCOUNTER — APPOINTMENT (OUTPATIENT)
Dept: PHYSICAL THERAPY | Facility: HOSPITAL | Age: 79
End: 2024-08-19
Payer: MEDICARE

## 2024-08-26 ENCOUNTER — APPOINTMENT (OUTPATIENT)
Dept: PHYSICAL THERAPY | Facility: HOSPITAL | Age: 79
End: 2024-08-26
Payer: MEDICARE

## 2024-08-26 DIAGNOSIS — R42 DIZZINESS: ICD-10-CM

## 2024-08-26 DIAGNOSIS — R26.89 IMBALANCE: Primary | ICD-10-CM

## 2024-08-26 PROCEDURE — 97110 THERAPEUTIC EXERCISES: CPT | Mod: GP | Performed by: PHYSICAL THERAPIST

## 2024-08-26 ASSESSMENT — PAIN - FUNCTIONAL ASSESSMENT: PAIN_FUNCTIONAL_ASSESSMENT: 0-10

## 2024-08-26 ASSESSMENT — PAIN SCALES - GENERAL: PAINLEVEL_OUTOF10: 0 - NO PAIN

## 2024-08-26 NOTE — PROGRESS NOTES
Physical Therapy    Physical Therapy Treatment / Discharge    Patient Name: Kristin Alaniz  MRN: [id number]  : 1945   Today's Date: 2024  Time Calculation  Start Time: 945  Stop Time:   Time Calculation (min): 30 min       PT Therapeutic Procedures Time Entry  Therapeutic Exercise Time Entry: 30       Visit #5    Assessment:   Pt has met most PT goals and maximum benefit from PT. She plans to continue with an independent HEP of balance retraining and gaze stabilization.    Plan:   Discharge to independent HEP.     Current Problem  1. Imbalance  Follow Up In Physical Therapy      2. Dizziness  Follow Up In Physical Therapy          Subjective   General  Pt states she has improved with PT, feeling more steady on feet. Denies falls. Typically no dizziness but does recognize an increase in dizziness when her anxiety is flared. States she has been keeping up with HEP of gaze stabilization and weight shifting. Feels she is almost 100% better. She states she is more limited by vision issues than dizziness at this point.     Precautions  Precautions  Precautions Comment: Fall risk    Pain  Pain Assessment: 0-10  0-10 (Numeric) Pain Score: 0 - No pain    Objective   Cervical AROM: WNL    Smooth pursuits: WNL  Saccades: WNL  Convergence: WNL    Head shake (horizontal): (-)   Head shake (vertical): (-)    Head thrust test: (+) katie though minor and intermittent    Resting nystagmus (blocked fixation): (-)  Gaze-evoked nystagmus (blocked fixation): (-) katie    Feet apart, eyes open: WNL   Feet apart, eyes closed: WNL  Feet together, eyes open: WNL  Feet together, eyes closed: LOB  Compliant surface, eyes open: SWAY  Compliant surface, eyes closed: LOB after 20 seconds    Demonstrates steadiness with cane gait for >200 ft    Outcome Measures:  Other Measures  Dizziness Handicap Inventory: 4    Treatments:  EXERCISES Date 2024  Date 2024  Date 2024  Date 2024    VISIT# #2 #3 #4 #5    REPS REPS REPS  "REPS          Nu-Step L2 10' L2 10' L2 10'           Parallel bars:       Lateral walking 2 laps 3 laps 3 laps    Marches 2 laps 3 laps 3 laps    Backwards walking 2 laps 3 laps 3 laps    Walk with head turns, horiz/vert 2 laps 3 laps ea 3 laps ea    X1 walk, horiz/vert  2 laps ea 2 laps ea    VOR cancel walk, horiz/vert  2 laps ea     Tandem stance 30\"x 2 ea      Walk with lateral step over                            Foam:       Head turns, horiz/vert       Reaching       Marching                                   Wall falls 10X 10x                          Seated:       2-target VOR       X2 horiz/vert                            Cane gait Reviewed          Reassess and review/progress HEP - 30 min    HEP: Access Code: [email or web address]  URL: [email or web address]  Date: 07/10/2024  Prepared by: Magno Bennett    Exercises  - Seated Gaze Stabilization with Head Rotation  - 2 x daily - 2 sets  - Seated Gaze Stabilization with Head Nod  - 2 x daily - 2 sets  - Side to Side Weight Shift with Unilateral Counter Support  - 2 x daily - 2 sets - 20 reps  - Staggered Stance Forward Backward Weight Shift with Counter Support  - 2 x daily - 2 sets - 20 reps Reviewed -         Goals:  Resolved       Dizziness/imbalance       Pt will walk safely with <=cane for >=200 feet for improved community negotiation.  (Met)       Start:  07/10/24    Expected End:  10/08/24    Resolved:  08/26/24         Improve DHI score >=40 points to decrease risk of falling.   (Met)       Start:  07/10/24    Expected End:  10/08/24    Resolved:  08/26/24         Pt will hold feet together, eyes closed for 30 seconds without LOB for walking in the dark.  (Adequate for Discharge)       Start:  07/10/24    Expected End:  10/08/24            Independent HEP for continued gains after PT is complete.  (Met)       Start:  07/10/24    Expected End:  10/08/24    Resolved:  08/26/24            "

## 2025-02-19 ENCOUNTER — OFFICE VISIT (OUTPATIENT)
Dept: NEUROLOGY | Facility: HOSPITAL | Age: 80
End: 2025-02-19
Payer: MEDICARE

## 2025-02-19 VITALS
DIASTOLIC BLOOD PRESSURE: 87 MMHG | SYSTOLIC BLOOD PRESSURE: 141 MMHG | HEART RATE: 73 BPM | BODY MASS INDEX: 20.86 KG/M2 | WEIGHT: 121.5 LBS

## 2025-02-19 DIAGNOSIS — R90.89 ABNORMAL BRAIN MRI: ICD-10-CM

## 2025-02-19 DIAGNOSIS — I63.89 OTHER CEREBRAL INFARCTION: ICD-10-CM

## 2025-02-19 DIAGNOSIS — R20.8 DECREASED SENSE OF VIBRATION: ICD-10-CM

## 2025-02-19 DIAGNOSIS — I63.9 CEREBROVASCULAR ACCIDENT (CVA), UNSPECIFIED MECHANISM (MULTI): Primary | ICD-10-CM

## 2025-02-19 DIAGNOSIS — F17.200 TOBACCO USE DISORDER: ICD-10-CM

## 2025-02-19 DIAGNOSIS — I67.848 OTHER CEREBROVASCULAR VASOSPASM AND VASOCONSTRICTION: ICD-10-CM

## 2025-02-19 PROCEDURE — G2211 COMPLEX E/M VISIT ADD ON: HCPCS | Performed by: PSYCHIATRY & NEUROLOGY

## 2025-02-19 PROCEDURE — 1160F RVW MEDS BY RX/DR IN RCRD: CPT | Performed by: PSYCHIATRY & NEUROLOGY

## 2025-02-19 PROCEDURE — 1157F ADVNC CARE PLAN IN RCRD: CPT | Performed by: PSYCHIATRY & NEUROLOGY

## 2025-02-19 PROCEDURE — 99215 OFFICE O/P EST HI 40 MIN: CPT | Performed by: PSYCHIATRY & NEUROLOGY

## 2025-02-19 PROCEDURE — 99205 OFFICE O/P NEW HI 60 MIN: CPT | Performed by: PSYCHIATRY & NEUROLOGY

## 2025-02-19 PROCEDURE — 1126F AMNT PAIN NOTED NONE PRSNT: CPT | Performed by: PSYCHIATRY & NEUROLOGY

## 2025-02-19 PROCEDURE — 1159F MED LIST DOCD IN RCRD: CPT | Performed by: PSYCHIATRY & NEUROLOGY

## 2025-02-19 RX ORDER — AMLODIPINE BESYLATE 5 MG/1
TABLET ORAL DAILY
COMMUNITY

## 2025-02-19 RX ORDER — VIT C/E/ZN/COPPR/LUTEIN/ZEAXAN 250MG-90MG
CAPSULE ORAL DAILY
COMMUNITY

## 2025-02-19 RX ORDER — ASPIRIN 81 MG/1
81 TABLET ORAL DAILY
COMMUNITY

## 2025-02-19 ASSESSMENT — PAIN SCALES - GENERAL: PAINLEVEL_OUTOF10: 0-NO PAIN

## 2025-02-19 ASSESSMENT — ENCOUNTER SYMPTOMS
LOSS OF SENSATION IN FEET: 0
OCCASIONAL FEELINGS OF UNSTEADINESS: 0
DEPRESSION: 0

## 2025-02-19 NOTE — PROGRESS NOTES
"In-clinic visit    Visit type: provider referral: Dr Lyman    PCP: CHRISTIANO HEADLEY MD.    Subjective     Kristin Alaniz is a 79 y.o. year old right handed female who presents with Stroke (Referral was for small incidental stroke from Dr Lyman. Pt denies knowing about the stroke) and Numbness.    Patient is accompanied by brother.     HPI    Pt not sure why she's here. Not sure who referred her here.    It appears pt saw Dr Manas Lyman at  for another opinion on dizziness 6/2024. Apparent reported normal ENT exam. Normal neuro exam. Previously seen another neurologist locally. Dr Lyman thought she had PPPD and referred her for vestibular therapy and to do MRI brain wo.    MRI brain was done, showed extensive WM changes involving B cerebral hemispheres and brainstem, with ovoid small focus of subacute infarct within the L periatrial WM without associated hemorrhage. Pt was started by Dr Lyman on ASA 81mg daily and ordered carotid US. Carotid US done, per report, did not show hemodynamically significant stenosis in B ICA. Pt referred to neurology.    Pt now here for that.    On ASA, states taking. Not on statin. No diabetes. Lives alone. Has PCP locally, states she goes for \"wellness\" checks every year.    No hx migraines. No stroke. No sz. No Can't remember once in a while. Not driving, eyes not seeing well. Dry macular degeneration. No balance problems.    No known heart problems.    Smoker, long time, 1/2 ppd, still smoking. No etoh. No street drug use.      Review of Systems   Constitutional:  Negative for appetite change, chills and fever.   HENT:  Negative for ear pain and nosebleeds.    Eyes:  Negative for discharge and itching.   Respiratory:  Negative for choking and chest tightness.    Cardiovascular:  Negative for chest pain and palpitations.   Gastrointestinal:  Negative for abdominal distention, abdominal pain and nausea.   Endocrine: Negative for cold intolerance and heat intolerance. "   Genitourinary:  Negative for difficulty urinating and dysuria.   Musculoskeletal:  Negative for gait problem and myalgias.   Neurological:  Negative for seizures.     Patient Active Problem List   Diagnosis    Imbalance    Dizziness       History reviewed. No pertinent past medical history.  History reviewed. No pertinent surgical history.  Social History     Tobacco Use    Smoking status: Every Day     Types: Cigarettes    Smokeless tobacco: Never   Substance Use Topics    Alcohol use: Not on file     family history is not on file.    Allergies   Allergen Reactions    Iodine Swelling       Current Outpatient Medications:     amLODIPine (Norvasc) 5 mg tablet, Take by mouth once daily., Disp: , Rfl:     aspirin 81 mg EC tablet, Take 1 tablet (81 mg) by mouth once daily., Disp: , Rfl:     cholecalciferol (Vitamin D-3) 25 MCG (1000 UT) capsule, Take by mouth once daily., Disp: , Rfl:     citalopram (CeleXA) 10 mg tablet, Take 1 tablet (10 mg) by mouth early in the morning.., Disp: , Rfl:     LORazepam (Ativan) 0.5 mg tablet, Take 1 tablet (0.5 mg) by mouth. (Patient taking differently: Take 1 tablet (0.5 mg) by mouth once daily.), Disp: , Rfl:     vit C/E/cuperic/zinc/lutein (PRESERVISION LUTEIN ORAL), Take by mouth., Disp: , Rfl:     Objective     /87   Pulse 73   Wt 55.1 kg (121 lb 8 oz)   BMI 20.86 kg/m²     Awake, alert, oriented, in no distress, conversant  Well-nourished, ambulatory independently  No leg edema    Mental status exam as above, conversant   Fair fund of knowledge  Recent/remote memory fair  Fair attention span  Pupils round reactive to light, 3-4 mm, (-) RAPD   Fundoscopic examination was attempted but fundus was not visualized bilaterally   Full EOMs intact, no nystagmus, no ptosis   V1 to V3 sensation is intact   No facial droop   Hearing grossly intact   No dysarthria  Good shoulder shrug bilaterally   Tongue is midline     Motor strength is 5/5 on all extremities, tone/bulk normal    Reflexes 1+ on all 4 extremities except absent L ankle reflex, downgoing toes bilaterally   Sensation is intact to light touch, vibration on all 4 extremities   Finger to nose test intact bilaterally   Negative Romberg sign   Normal gait       MR brain wo IV contrast 07/13/2024    Narrative  Interpreted By:  Cassidy Teixeira,  STUDY:  MR BRAIN WO IV CONTRAST; 7/13/2024 10:09 am    INDICATION:  Signs/Symptoms:imbalance and chronic dizziness, unknown etiology.    COMPARISON:  None.    ACCESSION NUMBER(S):  [id number]    ORDERING CLINICIAN:  YESICA SHEPPARD    TECHNIQUE:  Axial T2, FLAIR, DWI, gradient echo T2 and sagittal and coronal T1  weighted images of brain were acquired.    FINDINGS:  CSF Spaces: The ventricles, sulci and basal cisterns are diffusely  prominent indicating mild diffuse cerebral volume loss.  There is no  extra-axial fluid collection.    Parenchyma: There is an ovoid small focus of diffusion restriction  within the left periatrial white matter with low signal along its  margins. There is corresponding hyperintense signal on T2 and FLAIR.  The findings reflect a subacute infarct.    There are extensive confluence areas of hyperintense FLAIR signal in  bilateral periventricular and subcortical white matter, as well as in  central carlee likely reflecting sequela of small vessel ischemic  disease. There is a punctate focus of hemosiderin  deposition/calcification/mineralization within the right cerebellar  hemisphere. There are small remote infarcts within bilateral  cerebellar hemispheres.    There is no mass effect or midline shift.    Paranasal Sinuses and Mastoids: Visualized paranasal sinuses are  clear.    Bilateral mastoids are clear.    Impression  Ovoid small focus of subacute infarct within the left periatrial  white matter without associated hemorrhage.    Extensive white matter changes involving bilateral cerebral  hemispheres and brainstem.    Signed by: Cassidy Teixeira 7/13/2024 5:51  PM  Dictation workstation:   [id number]      Assessment/Plan     CVA, subacute 7/2024  Abnormal brain MRI  Tobacco use disorder  Incidental finding of subacute infarct in L periatrial region 7/2024 MRI brain  Images reviewed and discussed  Discussed ddx of extensive WM disease--no prior brain imaging for comparison  Has WM disease even in bilateral temporal lobes--no hx ADAMS  Had seen Dr Lyman--stroke  Discussed, likely stroke and extensive small vessel ischemic disease  On ASA  No statin  No heart work-up    Decreased sense of vibration  Incidental finding on exam    Plans:  Do TTE  Do holter monitor  Check B12  Continue aspirin 81mg daily  Will defer statin to PCP if needed  Control vascular risk factors c/o PCP  Quit smoking    Rtc pending above    All questions were answered.  Pt knows how to contact my office in case pt has any questions or concerns.    Gabino Leal MD

## 2025-02-19 NOTE — LETTER
"February 19, 2025     Manas Lyman MD PhD  [address]  Department Of Neurology  [facility] [address]    Patient: Kristin Alaniz   YOB: 1945   Date of Visit: 2/19/2025       Dear Dr. Manas Lyman MD PhD:    Thank you for referring Kristin Alaniz to me for evaluation. Below are my notes for this consultation.  If you have questions, please do not hesitate to call me. I look forward to following your patient along with you.       Sincerely,     Gabino Leal MD      CC: Nathalie Etienne MD  ______________________________________________________________________________________    In-clinic visit    Visit type: provider referral: Dr Lyman    PCP: NATHALIE ETIENNE MD.    Subjective    Kristin Alaniz is a 79 y.o. year old right handed female who presents with Stroke (Referral was for small incidental stroke from Dr Lyman. Pt denies knowing about the stroke) and Numbness.    Patient is accompanied by brother.     HPI    Pt not sure why she's here. Not sure who referred her here.    It appears pt saw Dr Manas Lyman at  for another opinion on dizziness 6/2024. Apparent reported normal ENT exam. Normal neuro exam. Previously seen another neurologist locally. Dr Lyman thought she had PPPD and referred her for vestibular therapy and to do MRI brain wo.    MRI brain was done, showed extensive WM changes involving B cerebral hemispheres and brainstem, with ovoid small focus of subacute infarct within the L periatrial WM without associated hemorrhage. Pt was started by Dr Lyman on ASA 81mg daily and ordered carotid US. Carotid US done, per report, did not show hemodynamically significant stenosis in B ICA. Pt referred to neurology.    Pt now here for that.    On ASA, states taking. Not on statin. No diabetes. Lives alone. Has PCP locally, states she goes for \"wellness\" checks every year.    No hx migraines. No stroke. No sz. No Can't remember once in a while. Not driving, eyes not seeing well. Dry macular " degeneration. No balance problems.    No known heart problems.    Smoker, long time, 1/2 ppd, still smoking. No etoh. No street drug use.      Review of Systems   Constitutional:  Negative for appetite change, chills and fever.   HENT:  Negative for ear pain and nosebleeds.    Eyes:  Negative for discharge and itching.   Respiratory:  Negative for choking and chest tightness.    Cardiovascular:  Negative for chest pain and palpitations.   Gastrointestinal:  Negative for abdominal distention, abdominal pain and nausea.   Endocrine: Negative for cold intolerance and heat intolerance.   Genitourinary:  Negative for difficulty urinating and dysuria.   Musculoskeletal:  Negative for gait problem and myalgias.   Neurological:  Negative for seizures.     Patient Active Problem List   Diagnosis   • Imbalance   • Dizziness       History reviewed. No pertinent past medical history.  History reviewed. No pertinent surgical history.  Social History     Tobacco Use   • Smoking status: Every Day     Types: Cigarettes   • Smokeless tobacco: Never   Substance Use Topics   • Alcohol use: Not on file     family history is not on file.    Allergies   Allergen Reactions   • Iodine Swelling       Current Outpatient Medications:   •  amLODIPine (Norvasc) 5 mg tablet, Take by mouth once daily., Disp: , Rfl:   •  aspirin 81 mg EC tablet, Take 1 tablet (81 mg) by mouth once daily., Disp: , Rfl:   •  cholecalciferol (Vitamin D-3) 25 MCG (1000 UT) capsule, Take by mouth once daily., Disp: , Rfl:   •  citalopram (CeleXA) 10 mg tablet, Take 1 tablet (10 mg) by mouth early in the morning.., Disp: , Rfl:   •  LORazepam (Ativan) 0.5 mg tablet, Take 1 tablet (0.5 mg) by mouth. (Patient taking differently: Take 1 tablet (0.5 mg) by mouth once daily.), Disp: , Rfl:   •  vit C/E/cuperic/zinc/lutein (PRESERVISION LUTEIN ORAL), Take by mouth., Disp: , Rfl:     Objective    /87   Pulse 73   Wt 55.1 kg (121 lb 8 oz)   BMI 20.86 kg/m²     Awake,  alert, oriented, in no distress, conversant  Well-nourished, ambulatory independently  No leg edema    Mental status exam as above, conversant   Fair fund of knowledge  Recent/remote memory fair  Fair attention span  Pupils round reactive to light, 3-4 mm, (-) RAPD   Fundoscopic examination was attempted but fundus was not visualized bilaterally   Full EOMs intact, no nystagmus, no ptosis   V1 to V3 sensation is intact   No facial droop   Hearing grossly intact   No dysarthria  Good shoulder shrug bilaterally   Tongue is midline     Motor strength is 5/5 on all extremities, tone/bulk normal   Reflexes 1+ on all 4 extremities except absent L ankle reflex, downgoing toes bilaterally   Sensation is intact to light touch, vibration on all 4 extremities   Finger to nose test intact bilaterally   Negative Romberg sign   Normal gait       MR brain wo IV contrast 07/13/2024    Narrative  Interpreted By:  Cassidy Teixeira,  STUDY:  MR BRAIN WO IV CONTRAST; 7/13/2024 10:09 am    INDICATION:  Signs/Symptoms:imbalance and chronic dizziness, unknown etiology.    COMPARISON:  None.    ACCESSION NUMBER(S):  [id number]    ORDERING CLINICIAN:  YESICA SHEPPARD    TECHNIQUE:  Axial T2, FLAIR, DWI, gradient echo T2 and sagittal and coronal T1  weighted images of brain were acquired.    FINDINGS:  CSF Spaces: The ventricles, sulci and basal cisterns are diffusely  prominent indicating mild diffuse cerebral volume loss.  There is no  extra-axial fluid collection.    Parenchyma: There is an ovoid small focus of diffusion restriction  within the left periatrial white matter with low signal along its  margins. There is corresponding hyperintense signal on T2 and FLAIR.  The findings reflect a subacute infarct.    There are extensive confluence areas of hyperintense FLAIR signal in  bilateral periventricular and subcortical white matter, as well as in  central carlee likely reflecting sequela of small vessel ischemic  disease. There is a  punctate focus of hemosiderin  deposition/calcification/mineralization within the right cerebellar  hemisphere. There are small remote infarcts within bilateral  cerebellar hemispheres.    There is no mass effect or midline shift.    Paranasal Sinuses and Mastoids: Visualized paranasal sinuses are  clear.    Bilateral mastoids are clear.    Impression  Ovoid small focus of subacute infarct within the left periatrial  white matter without associated hemorrhage.    Extensive white matter changes involving bilateral cerebral  hemispheres and brainstem.    Signed by: Cassidy Teixeira 7/13/2024 5:51 PM  Dictation workstation:   [id number]      Assessment/Plan    CVA, subacute 7/2024  Abnormal brain MRI  Tobacco use disorder  Incidental finding of subacute infarct in L periatrial region 7/2024 MRI brain  Images reviewed and discussed  Discussed ddx of extensive WM disease--no prior brain imaging for comparison  Has WM disease even in bilateral temporal lobes--no hx ADAMS  Had seen Dr Lyman--stroke  Discussed, likely stroke and extensive small vessel ischemic disease  On ASA  No statin  No heart work-up    Decreased sense of vibration  Incidental finding on exam    Plans:  Do TTE  Do holter monitor  Check B12  Continue aspirin 81mg daily  Will defer statin to PCP if needed  Control vascular risk factors c/o PCP  Quit smoking    Rtc pending above    All questions were answered.  Pt knows how to contact my office in case pt has any questions or concerns.    Gabino Leal MD

## 2025-02-19 NOTE — PATIENT INSTRUCTIONS
Do TTE  Do holter monitor  Check B12  Continue aspirin 81mg daily  Will defer statin to PCP if needed  Control vascular risk factors c/o PCP  Quit smoking    Rtc pending above

## 2025-02-21 ENCOUNTER — TELEPHONE (OUTPATIENT)
Dept: NEUROLOGY | Facility: HOSPITAL | Age: 80
End: 2025-02-21
Payer: MEDICARE

## 2025-02-21 NOTE — TELEPHONE ENCOUNTER
Received a call from [facility] that pt was there to have B12 drawn, however, lab order that pt presented them was not signed by physician.      I have sent a copy of the requisition that their lab faxed to me to Kenia Thomas.  I have LVMM for Kenia as well.      Will contact Kenia Monday re this situation.    Lissette (P) [phone]                 (F) [phone]    Just called pt to update her on our efforts to fix the issue and she states she went to a 2nd lab and was drawn without incident.

## 2025-02-22 LAB — VIT B12 SERPL-MCNC: 593 PG/ML (ref 200–1100)

## 2025-03-13 ENCOUNTER — HOSPITAL ENCOUNTER (OUTPATIENT)
Dept: CARDIOLOGY | Facility: HOSPITAL | Age: 80
Discharge: HOME | End: 2025-03-13
Payer: MEDICARE

## 2025-03-13 DIAGNOSIS — I63.9 CEREBROVASCULAR ACCIDENT (CVA), UNSPECIFIED MECHANISM (MULTI): ICD-10-CM

## 2025-03-13 DIAGNOSIS — I63.89 OTHER CEREBRAL INFARCTION: ICD-10-CM

## 2025-03-13 DIAGNOSIS — I67.848 OTHER CEREBROVASCULAR VASOSPASM AND VASOCONSTRICTION: ICD-10-CM

## 2025-03-13 LAB
AORTIC VALVE MEAN GRADIENT: 4 MMHG
AORTIC VALVE PEAK VELOCITY: 1.63 M/S
AV PEAK GRADIENT: 11 MMHG
AVA (PEAK VEL): 2.38 CM2
AVA (VTI): 2.72 CM2
EJECTION FRACTION APICAL 4 CHAMBER: 62.6
EJECTION FRACTION: 55 %
LEFT ATRIUM VOLUME AREA LENGTH INDEX BSA: 10.5 ML/M2
LEFT VENTRICLE INTERNAL DIMENSION DIASTOLE: 4.11 CM (ref 3.5–6)
LEFT VENTRICULAR OUTFLOW TRACT DIAMETER: 1.99 CM
LV EJECTION FRACTION BIPLANE: 51 %
MITRAL VALVE E/A RATIO: 0.89
RIGHT VENTRICLE FREE WALL PEAK S': 12.7 CM/S
RIGHT VENTRICLE PEAK SYSTOLIC PRESSURE: 29.2 MMHG
TRICUSPID ANNULAR PLANE SYSTOLIC EXCURSION: 1.9 CM

## 2025-03-13 PROCEDURE — 93242 EXT ECG>48HR<7D RECORDING: CPT

## 2025-03-13 PROCEDURE — 93306 TTE W/DOPPLER COMPLETE: CPT | Performed by: INTERNAL MEDICINE

## 2025-03-13 PROCEDURE — 93306 TTE W/DOPPLER COMPLETE: CPT

## 2025-03-31 ENCOUNTER — TELEPHONE (OUTPATIENT)
Dept: NEUROLOGY | Facility: HOSPITAL | Age: 80
End: 2025-03-31
Payer: MEDICARE

## 2025-03-31 NOTE — TELEPHONE ENCOUNTER
3/31/25 3:45p  Holter noted. SVT.  TTE ok. B12 ok    Tried to call pt to discuss and poss referral to cardiology, no answer. ? Non-working number.     Will try again    Dr pineda

## 2025-04-01 DIAGNOSIS — I47.10 SVT (SUPRAVENTRICULAR TACHYCARDIA) (CMS-HCC): Primary | ICD-10-CM

## 2025-04-08 ENCOUNTER — APPOINTMENT (OUTPATIENT)
Dept: CARDIOLOGY | Facility: HOSPITAL | Age: 80
End: 2025-04-08
Payer: MEDICARE

## 2025-04-29 ENCOUNTER — OFFICE VISIT (OUTPATIENT)
Dept: CARDIOLOGY | Facility: HOSPITAL | Age: 80
End: 2025-04-29
Payer: MEDICARE

## 2025-04-29 VITALS
HEIGHT: 64 IN | BODY MASS INDEX: 20.66 KG/M2 | SYSTOLIC BLOOD PRESSURE: 114 MMHG | DIASTOLIC BLOOD PRESSURE: 70 MMHG | WEIGHT: 121 LBS | HEART RATE: 71 BPM

## 2025-04-29 DIAGNOSIS — I47.10 SVT (SUPRAVENTRICULAR TACHYCARDIA) (CMS-HCC): Primary | ICD-10-CM

## 2025-04-29 LAB
ATRIAL RATE: 71 BPM
P AXIS: 77 DEGREES
P OFFSET: 184 MS
P ONSET: 128 MS
PR INTERVAL: 148 MS
Q ONSET: 202 MS
QRS COUNT: 11 BEATS
QRS DURATION: 118 MS
QT INTERVAL: 444 MS
QTC CALCULATION(BAZETT): 482 MS
QTC FREDERICIA: 469 MS
R AXIS: 264 DEGREES
T AXIS: 39 DEGREES
T OFFSET: 424 MS
VENTRICULAR RATE: 71 BPM

## 2025-04-29 PROCEDURE — 1157F ADVNC CARE PLAN IN RCRD: CPT | Performed by: INTERNAL MEDICINE

## 2025-04-29 PROCEDURE — 99213 OFFICE O/P EST LOW 20 MIN: CPT | Mod: 25 | Performed by: INTERNAL MEDICINE

## 2025-04-29 PROCEDURE — 93010 ELECTROCARDIOGRAM REPORT: CPT | Performed by: INTERNAL MEDICINE

## 2025-04-29 PROCEDURE — 93005 ELECTROCARDIOGRAM TRACING: CPT | Performed by: INTERNAL MEDICINE

## 2025-04-29 PROCEDURE — 1159F MED LIST DOCD IN RCRD: CPT | Performed by: INTERNAL MEDICINE

## 2025-04-29 PROCEDURE — 99203 OFFICE O/P NEW LOW 30 MIN: CPT | Performed by: INTERNAL MEDICINE

## 2025-04-29 PROCEDURE — 1160F RVW MEDS BY RX/DR IN RCRD: CPT | Performed by: INTERNAL MEDICINE

## 2025-04-29 NOTE — LETTER
April 29, 2025     Gabino Leal MD  [address]  [facility] [address]    Patient: Kristin Alaniz   YOB: 1945   Date of Visit: 4/29/2025       Dear Dr. Gabino Leal MD:    Thank you for referring Kristin Alaniz to me for evaluation. Below are my notes for this consultation.  If you have questions, please do not hesitate to call me. I look forward to following your patient along with you.       Sincerely,     Román Roman MD      CC: Nathalie Etienne MD  ______________________________________________________________________________________    Referred by Dr. Leal for SVT    Counseling:  The patient was counseled regarding diagnostic results, instructions for management, risk factor reductions, prognosis, patient and family education, impressions, risks and benefits of treatment options and importance of compliance with treatment.       History Of Present Illness:    Kristin Alaniz is a 79 y.o. female patient whose PMH is significant for CVA and nicotine dependence. She presents today in the company of a family member to establish cardiovascular care for the evaluation and management of SVT. In July 2024, the patient suffered a subacute infarct in the left periatrial region, for which she is being followed by Dr. Leal. Holter monitoring performed from 03/13/2025 to 03/20/2025 revealed 6 runs of SVT, a VE burden of <1% and an SVE burden of 2%. Echocardiogram performed 03/13/2025 demonstrated an EF of 55% and mild aortic valve regurgitation. The patient denies any prior history of heart disease. She denies any CP, chest discomfort, SOB or palpitations. BP has been stable. The patient is compliant with her prescribed medications.     Past Surgical History:  She has no past surgical history on file.      Social History:  She reports that she has been smoking cigarettes. She has never used smokeless tobacco. She reports that she does not currently use alcohol. She reports that  "she does not use drugs.    Family History:  Family History[1]     Allergies:  Iodine    Outpatient Medications:  Current Outpatient Medications   Medication Instructions   • amLODIPine (Norvasc) 5 mg tablet Daily   • aspirin 81 mg, Daily   • cholecalciferol (Vitamin D-3) 25 MCG (1000 UT) capsule Daily   • citalopram (CELEXA) 10 mg, Daily (0630)   • LORazepam (ATIVAN) 0.5 mg   • vit C/E/cuperic/zinc/lutein (PRESERVISION LUTEIN ORAL) Take by mouth.        Last Recorded Vitals:  Vitals:    04/29/25 1253   BP: 114/70   Pulse: 71   Weight: 54.9 kg (121 lb)   Height: 1.626 m (5' 4\")       Review of Systems   All other systems reviewed and are negative.     Physical Exam:  Constitutional:       Appearance: Healthy appearance. Not in distress.   Neck:      Vascular: No JVR. JVD normal.   Pulmonary:      Effort: Pulmonary effort is normal.      Breath sounds: Normal breath sounds. No wheezing. No rhonchi. No rales.   Chest:      Chest wall: Not tender to palpatation.   Cardiovascular:      PMI at left midclavicular line. Normal rate. Regular rhythm. Normal S1. Normal S2.       Murmurs: There is no murmur.      No gallop.  No click. No rub.   Pulses:     Intact distal pulses.   Edema:     Peripheral edema absent.   Abdominal:      General: Bowel sounds are normal.      Palpations: Abdomen is soft.      Tenderness: There is no abdominal tenderness.   Musculoskeletal: Normal range of motion.         General: No tenderness. Skin:     General: Skin is warm and dry.   Neurological:      General: No focal deficit present.      Mental Status: Alert and oriented to person, place and time.        Last Labs:  CBC -  No results found for: \"WBC\", \"HGB\", \"HCT\", \"MCV\", \"PLT\"    CMP -  No results found for: \"CALCIUM\", \"PHOS\", \"PROT\", \"ALBUMIN\", \"AST\", \"ALT\", \"ALKPHOS\", \"BILITOT\"    LIPID PANEL -   No results found for: \"CHOL\", \"TRIG\", \"HDL\", \"CHHDL\", \"LDLF\", \"VLDL\", \"NHDL\"    RENAL FUNCTION PANEL -   No results found for: \"GLUCOSE\", \"NA\", " "\"K\", \"CL\", \"CO2\", \"ANIONGAP\", \"BUN\", \"CREATININE\", \"GFRMALE\", \"CALCIUM\", \"PHOS\", \"ALBUMIN\"     No results found for: \"BNP\", \"HGBA1C\"    Last Cardiology Tests:  03/13/2025 to 03/20/2025  - Holter Monitor  1. Predominant underlying rhythm was sinus rhythm; min HR 40 bpm, max  bpm, avg HR 59 bpm.  2. 6 occurrences of supraventricular tachycardia; fastest episode 119 bpm, longest episode 6 beats.  3. VE burden of <1%.  4. SVE burden of 2%.     03/13/2025 - TTE  1. Left ventricular ejection fraction is low normal, by visual estimate at 55%.  2. Mild aortic valve regurgitation.    08/14/2024 - Vascular Lab Carotid Artery Duplex    1. Right Carotid: Today's exam was limited due to heavy calcification with resulting shadowing. Findings are consistent with less than 50% stenosis of the right proximal internal carotid artery. Right external carotid artery appears patent with no evidence of stenosis. The right vertebral artery is patent with antegrade flow. No evidence of hemodynamically significant stenosis in the right subclavian artery.  2. Left Carotid: Findings are consistent with less than 50% stenosis of the left proximal internal carotid artery. Left external carotid artery appears patent with no evidence of stenosis. The left vertebral artery is patent with antegrade flow. No evidence of hemodynamically significant stenosis in the left subclavian artery.    Lab review: I have personally reviewed the laboratory result(s).  Diagnostic review: I have personally reviewed the result(s) of the Echocardiogram and Holter Monitor.    Assessment/Plan  1) SVT  On ASA 81 mg daily, amlodipine 5 mg daily  Subacute infarct in left periatrial region July 2024 - followed by Dr. Leal  Carotid duplex 08/14/2024 with <50% stenosis bilaterally   Holter 03/13/2025 to 03/20/2025 with 6 runs of SVT, VE burden of <1%, SVE burden of 2%  TTE 03/13/2025 with LVEF 55%, mild aortic valve regurgitation  Denies personal past h/o heart " disease  Denies CP, chest discomfort or SOB  Denies palpitations  BP stable   Favor conservative management as patient is asymptomatic   Recommend lipid panel through primary   Check CT calcium score  F/U 1 year         Scribe Attestation  By signing my name below, I, Clare Trinh   attest that this documentation has been prepared under the direction and in the presence of Román Roman MD.        [1]  No family history on file.       [1]  No family history on file.

## 2025-04-29 NOTE — PROGRESS NOTES
"Referred by Dr. Leal for SVT    Counseling:  The patient was counseled regarding diagnostic results, instructions for management, risk factor reductions, prognosis, patient and family education, impressions, risks and benefits of treatment options and importance of compliance with treatment.       History Of Present Illness:    Kristin Alaniz is a 79 y.o. female patient whose PMH is significant for CVA and nicotine dependence. She presents today in the company of a family member to establish cardiovascular care for the evaluation and management of SVT. In July 2024, the patient suffered a subacute infarct in the left periatrial region, for which she is being followed by Dr. Leal. Holter monitoring performed from 03/13/2025 to 03/20/2025 revealed 6 runs of SVT, a VE burden of <1% and an SVE burden of 2%. Echocardiogram performed 03/13/2025 demonstrated an EF of 55% and mild aortic valve regurgitation. The patient denies any prior history of heart disease. She denies any CP, chest discomfort, SOB or palpitations. BP has been stable. The patient is compliant with her prescribed medications.     Past Surgical History:  She has no past surgical history on file.      Social History:  She reports that she has been smoking cigarettes. She has never used smokeless tobacco. She reports that she does not currently use alcohol. She reports that she does not use drugs.    Family History:  Family History[1]     Allergies:  Iodine    Outpatient Medications:  Current Outpatient Medications   Medication Instructions    amLODIPine (Norvasc) 5 mg tablet Daily    aspirin 81 mg, Daily    cholecalciferol (Vitamin D-3) 25 MCG (1000 UT) capsule Daily    citalopram (CELEXA) 10 mg, Daily (0630)    LORazepam (ATIVAN) 0.5 mg    vit C/E/cuperic/zinc/lutein (PRESERVISION LUTEIN ORAL) Take by mouth.        Last Recorded Vitals:  Vitals:    04/29/25 1253   BP: 114/70   Pulse: 71   Weight: 54.9 kg (121 lb)   Height: 1.626 m (5' 4\")       Review of " "Systems   All other systems reviewed and are negative.     Physical Exam:  Constitutional:       Appearance: Healthy appearance. Not in distress.   Neck:      Vascular: No JVR. JVD normal.   Pulmonary:      Effort: Pulmonary effort is normal.      Breath sounds: Normal breath sounds. No wheezing. No rhonchi. No rales.   Chest:      Chest wall: Not tender to palpatation.   Cardiovascular:      PMI at left midclavicular line. Normal rate. Regular rhythm. Normal S1. Normal S2.       Murmurs: There is no murmur.      No gallop.  No click. No rub.   Pulses:     Intact distal pulses.   Edema:     Peripheral edema absent.   Abdominal:      General: Bowel sounds are normal.      Palpations: Abdomen is soft.      Tenderness: There is no abdominal tenderness.   Musculoskeletal: Normal range of motion.         General: No tenderness. Skin:     General: Skin is warm and dry.   Neurological:      General: No focal deficit present.      Mental Status: Alert and oriented to person, place and time.        Last Labs:  CBC -  No results found for: \"WBC\", \"HGB\", \"HCT\", \"MCV\", \"PLT\"    CMP -  No results found for: \"CALCIUM\", \"PHOS\", \"PROT\", \"ALBUMIN\", \"AST\", \"ALT\", \"ALKPHOS\", \"BILITOT\"    LIPID PANEL -   No results found for: \"CHOL\", \"TRIG\", \"HDL\", \"CHHDL\", \"LDLF\", \"VLDL\", \"NHDL\"    RENAL FUNCTION PANEL -   No results found for: \"GLUCOSE\", \"NA\", \"K\", \"CL\", \"CO2\", \"ANIONGAP\", \"BUN\", \"CREATININE\", \"GFRMALE\", \"CALCIUM\", \"PHOS\", \"ALBUMIN\"     No results found for: \"BNP\", \"HGBA1C\"    Last Cardiology Tests:  03/13/2025 to 03/20/2025  - Holter Monitor  1. Predominant underlying rhythm was sinus rhythm; min HR 40 bpm, max  bpm, avg HR 59 bpm.  2. 6 occurrences of supraventricular tachycardia; fastest episode 119 bpm, longest episode 6 beats.  3. VE burden of <1%.  4. SVE burden of 2%.     03/13/2025 - TTE  1. Left ventricular ejection fraction is low normal, by visual estimate at 55%.  2. Mild aortic valve regurgitation.    08/14/2024 - " Vascular Lab Carotid Artery Duplex    1. Right Carotid: Today's exam was limited due to heavy calcification with resulting shadowing. Findings are consistent with less than 50% stenosis of the right proximal internal carotid artery. Right external carotid artery appears patent with no evidence of stenosis. The right vertebral artery is patent with antegrade flow. No evidence of hemodynamically significant stenosis in the right subclavian artery.  2. Left Carotid: Findings are consistent with less than 50% stenosis of the left proximal internal carotid artery. Left external carotid artery appears patent with no evidence of stenosis. The left vertebral artery is patent with antegrade flow. No evidence of hemodynamically significant stenosis in the left subclavian artery.    Lab review: I have personally reviewed the laboratory result(s).  Diagnostic review: I have personally reviewed the result(s) of the Echocardiogram and Holter Monitor.    Assessment/Plan   1) SVT  On ASA 81 mg daily, amlodipine 5 mg daily  Subacute infarct in left periatrial region July 2024 - followed by Dr. Leal  Carotid duplex 08/14/2024 with <50% stenosis bilaterally   Holter 03/13/2025 to 03/20/2025 with 6 runs of SVT, VE burden of <1%, SVE burden of 2%  TTE 03/13/2025 with LVEF 55%, mild aortic valve regurgitation  Denies personal past h/o heart disease  Denies CP, chest discomfort or SOB  Denies palpitations  BP stable   Favor conservative management as patient is asymptomatic   Recommend lipid panel through primary   Check CT calcium score  F/U 1 year         Scribe Attestation  By signing my name below, I, Clare Trinh   attest that this documentation has been prepared under the direction and in the presence of Román Roman MD.        [1] No family history on file.

## 2025-04-29 NOTE — PATIENT INSTRUCTIONS
Continue all current medications as prescribed.  Dr. Roman has ordered a screening CT calcium score to evaluate for any cholesterol plaque along the vessels of the heart. You will be notified of the results once they become available.    Dr. Roman has recommended that you get blood work drawn sooner than later to check on your cholesterol.  Followup with Dr. Roman in 1 year, sooner should any issues or concerns arise before then.     If you have any questions or cardiac concerns, please call our office at [phone].

## 2025-05-06 ENCOUNTER — TELEPHONE (OUTPATIENT)
Dept: CARDIOLOGY | Facility: HOSPITAL | Age: 80
End: 2025-05-06
Payer: MEDICARE

## 2025-05-06 DIAGNOSIS — E78.5 HYPERLIPIDEMIA, UNSPECIFIED HYPERLIPIDEMIA TYPE: Primary | ICD-10-CM

## 2025-05-06 NOTE — TELEPHONE ENCOUNTER
Pt brother Harrison called to relay lipid panel results from PCP. Result is from 7/24/2024    Total: 225  LGL: 138  HDL: 73  Triglycerides: 68    Routing to nursing pool for further assistance.

## 2025-05-08 ENCOUNTER — HOSPITAL ENCOUNTER (OUTPATIENT)
Age: 80
Discharge: HOME | End: 2025-05-08
Payer: MEDICARE

## 2025-05-08 LAB
25(OH)D3 SERPL-MCNC: 53.7 NG/ML (ref 30–100)
ALBUMIN SERPL-MCNC: 4.2 G/DL (ref 3.5–5.2)
ALP SERPL-CCNC: 86 U/L (ref 35–104)
ALT SERPL-CCNC: 14 U/L (ref 0–32)
ANION GAP SERPL CALCULATED.3IONS-SCNC: 11 MMOL/L (ref 7–16)
AST SERPL-CCNC: 19 U/L (ref 0–31)
BASOPHILS # BLD: 0.04 K/UL (ref 0–0.2)
BASOPHILS NFR BLD: 1 % (ref 0–2)
BILIRUB SERPL-MCNC: 0.4 MG/DL (ref 0–1.2)
BUN SERPL-MCNC: 21 MG/DL (ref 6–23)
CALCIUM SERPL-MCNC: 9.8 MG/DL (ref 8.6–10.2)
CHLORIDE SERPL-SCNC: 101 MMOL/L (ref 98–107)
CHOLEST SERPL-MCNC: 246 MG/DL
CO2 SERPL-SCNC: 28 MMOL/L (ref 22–29)
CREAT SERPL-MCNC: 1 MG/DL (ref 0.5–1)
EOSINOPHIL # BLD: 0.13 K/UL (ref 0.05–0.5)
EOSINOPHILS RELATIVE PERCENT: 2 % (ref 0–6)
ERYTHROCYTE [DISTWIDTH] IN BLOOD BY AUTOMATED COUNT: 13.6 % (ref 11.5–15)
GFR, ESTIMATED: 54 ML/MIN/1.73M2
GLUCOSE SERPL-MCNC: 101 MG/DL (ref 74–99)
HCT VFR BLD AUTO: 49.4 % (ref 34–48)
HDLC SERPL-MCNC: 74 MG/DL
HGB BLD-MCNC: 15.9 G/DL (ref 11.5–15.5)
IMM GRANULOCYTES # BLD AUTO: 0.03 K/UL (ref 0–0.58)
IMM GRANULOCYTES NFR BLD: 0 % (ref 0–5)
LDLC SERPL CALC-MCNC: 156 MG/DL
LYMPHOCYTES NFR BLD: 1.82 K/UL (ref 1.5–4)
LYMPHOCYTES RELATIVE PERCENT: 24 % (ref 20–42)
MCH RBC QN AUTO: 30.1 PG (ref 26–35)
MCHC RBC AUTO-ENTMCNC: 32.2 G/DL (ref 32–34.5)
MCV RBC AUTO: 93.4 FL (ref 80–99.9)
MONOCYTES NFR BLD: 0.55 K/UL (ref 0.1–0.95)
MONOCYTES NFR BLD: 7 % (ref 2–12)
NEUTROPHILS NFR BLD: 66 % (ref 43–80)
NEUTS SEG NFR BLD: 4.97 K/UL (ref 1.8–7.3)
PLATELET # BLD AUTO: 261 K/UL (ref 130–450)
PMV BLD AUTO: 10.4 FL (ref 7–12)
POTASSIUM SERPL-SCNC: 4.8 MMOL/L (ref 3.5–5)
PROT SERPL-MCNC: 7.8 G/DL (ref 6.4–8.3)
RBC # BLD AUTO: 5.29 M/UL (ref 3.5–5.5)
SODIUM SERPL-SCNC: 140 MMOL/L (ref 132–146)
T4 FREE SERPL-MCNC: 1.1 NG/DL (ref 0.9–1.7)
TRIGL SERPL-MCNC: 81 MG/DL
TSH SERPL DL<=0.05 MIU/L-ACNC: 2.89 UIU/ML (ref 0.27–4.2)
VLDLC SERPL CALC-MCNC: 16 MG/DL
WBC OTHER # BLD: 7.5 K/UL (ref 4.5–11.5)

## 2025-05-08 PROCEDURE — 85025 COMPLETE CBC W/AUTO DIFF WBC: CPT

## 2025-05-08 PROCEDURE — 84443 ASSAY THYROID STIM HORMONE: CPT

## 2025-05-08 PROCEDURE — 82306 VITAMIN D 25 HYDROXY: CPT

## 2025-05-08 PROCEDURE — 36415 COLL VENOUS BLD VENIPUNCTURE: CPT

## 2025-05-08 PROCEDURE — 84439 ASSAY OF FREE THYROXINE: CPT

## 2025-05-08 PROCEDURE — 80061 LIPID PANEL: CPT

## 2025-05-08 PROCEDURE — 80053 COMPREHEN METABOLIC PANEL: CPT

## 2025-05-08 RX ORDER — ATORVASTATIN CALCIUM 80 MG/1
40 TABLET, FILM COATED ORAL DAILY
Qty: 45 TABLET | Refills: 3 | Status: SHIPPED | OUTPATIENT
Start: 2025-05-08 | End: 2026-05-08

## 2025-05-08 NOTE — TELEPHONE ENCOUNTER
RN called pt at this time regarding lab work. Per Dr. Roman wants patient started on Atorvastatin 40 mg to get LDL <100. Pt verbalized understanding.

## (undated) DEVICE — ENCORE® LATEX MICRO SIZE 8.5, STERILE LATEX POWDER-FREE SURGICAL GLOVE: Brand: ENCORE

## (undated) DEVICE — SOLUTION SCRB 32OZ 7.5% POVIDONE IOD BTL GENTLE EFFECTIVE

## (undated) DEVICE — SOLUTION IV IRRIG POUR BRL 0.9% SODIUM CHL 2F7124

## (undated) DEVICE — STERILE POLYISOPRENE POWDER-FREE SURGICAL GLOVES: Brand: PROTEXIS

## (undated) DEVICE — Device

## (undated) DEVICE — SOLUTION IV IRRIG WATER 1000ML POUR BRL 2F7114

## (undated) DEVICE — SURGICAL PROCEDURE PACK CATRCT LT EYE BASIC CUST ST JOS LF

## (undated) DEVICE — 3 ML SYRINGE LUER-LOCK TIP: Brand: MONOJECT